# Patient Record
Sex: FEMALE | Race: WHITE
[De-identification: names, ages, dates, MRNs, and addresses within clinical notes are randomized per-mention and may not be internally consistent; named-entity substitution may affect disease eponyms.]

---

## 2018-06-19 ENCOUNTER — HOSPITAL ENCOUNTER (OUTPATIENT)
Dept: HOSPITAL 92 - DTY/OP | Age: 53
Discharge: HOME | End: 2018-06-19
Attending: SURGERY
Payer: COMMERCIAL

## 2018-06-19 DIAGNOSIS — I10: ICD-10-CM

## 2018-06-19 DIAGNOSIS — E11.65: ICD-10-CM

## 2018-06-19 DIAGNOSIS — E66.01: Primary | ICD-10-CM

## 2018-06-19 PROCEDURE — 97802 MEDICAL NUTRITION INDIV IN: CPT

## 2018-12-17 ENCOUNTER — HOSPITAL ENCOUNTER (OUTPATIENT)
Dept: HOSPITAL 92 - LABBT | Age: 53
Discharge: HOME | End: 2018-12-17
Attending: SURGERY
Payer: COMMERCIAL

## 2018-12-17 ENCOUNTER — HOSPITAL ENCOUNTER (INPATIENT)
Dept: HOSPITAL 92 - SURG A | Age: 53
LOS: 4 days | Discharge: HOME | DRG: 621 | End: 2018-12-21
Attending: SURGERY | Admitting: SURGERY
Payer: COMMERCIAL

## 2018-12-17 VITALS — BODY MASS INDEX: 48.6 KG/M2

## 2018-12-17 DIAGNOSIS — E11.9: ICD-10-CM

## 2018-12-17 DIAGNOSIS — E66.01: ICD-10-CM

## 2018-12-17 DIAGNOSIS — Z79.84: ICD-10-CM

## 2018-12-17 DIAGNOSIS — Z01.818: Primary | ICD-10-CM

## 2018-12-17 DIAGNOSIS — E66.01: Primary | ICD-10-CM

## 2018-12-17 DIAGNOSIS — I10: ICD-10-CM

## 2018-12-17 LAB
ALBUMIN SERPL BCG-MCNC: 4.7 G/DL (ref 3.5–5)
ALP SERPL-CCNC: 75 U/L (ref 40–150)
ALT SERPL W P-5'-P-CCNC: 97 U/L (ref 8–55)
ANION GAP SERPL CALC-SCNC: 13 MMOL/L (ref 10–20)
AST SERPL-CCNC: 52 U/L (ref 5–34)
BASOPHILS # BLD AUTO: 0 THOU/UL (ref 0–0.2)
BASOPHILS NFR BLD AUTO: 0.4 % (ref 0–1)
BILIRUB DIRECT SERPL-MCNC: 0.3 MG/DL (ref 0.1–0.3)
BILIRUB SERPL-MCNC: 0.5 MG/DL (ref 0.2–1.2)
BUN SERPL-MCNC: 10 MG/DL (ref 9.8–20.1)
CALCIUM SERPL-MCNC: 10 MG/DL (ref 7.8–10.44)
CHLORIDE SERPL-SCNC: 102 MMOL/L (ref 98–107)
CO2 SERPL-SCNC: 26 MMOL/L (ref 22–29)
CREAT CL PREDICTED SERPL C-G-VRATE: 0 ML/MIN (ref 70–130)
EOSINOPHIL # BLD AUTO: 0.1 THOU/UL (ref 0–0.7)
EOSINOPHIL NFR BLD AUTO: 1.9 % (ref 0–10)
GLOBULIN SER CALC-MCNC: 3.9 G/DL (ref 2.4–3.5)
GLUCOSE SERPL-MCNC: 120 MG/DL (ref 70–105)
HGB BLD-MCNC: 14.5 G/DL (ref 12–16)
LYMPHOCYTES # BLD: 2.6 THOU/UL (ref 1.2–3.4)
LYMPHOCYTES NFR BLD AUTO: 34.1 % (ref 21–51)
MCH RBC QN AUTO: 30.8 PG (ref 27–31)
MCV RBC AUTO: 93.4 FL (ref 78–98)
MONOCYTES # BLD AUTO: 0.6 THOU/UL (ref 0.11–0.59)
MONOCYTES NFR BLD AUTO: 7.8 % (ref 0–10)
NEUTROPHILS # BLD AUTO: 4.2 THOU/UL (ref 1.4–6.5)
NEUTROPHILS NFR BLD AUTO: 55.8 % (ref 42–75)
PLATELET # BLD AUTO: 242 THOU/UL (ref 130–400)
POTASSIUM SERPL-SCNC: 3.7 MMOL/L (ref 3.5–5.1)
PREGS CONTROL BACKGROUND?: (no result)
PREGS CONTROL BAR APPEAR?: YES
RBC # BLD AUTO: 4.72 MILL/UL (ref 4.2–5.4)
SODIUM SERPL-SCNC: 137 MMOL/L (ref 136–145)
WBC # BLD AUTO: 7.5 THOU/UL (ref 4.8–10.8)

## 2018-12-17 PROCEDURE — 85025 COMPLETE CBC W/AUTO DIFF WBC: CPT

## 2018-12-17 PROCEDURE — 83036 HEMOGLOBIN GLYCOSYLATED A1C: CPT

## 2018-12-17 PROCEDURE — 80076 HEPATIC FUNCTION PANEL: CPT

## 2018-12-17 PROCEDURE — 93010 ELECTROCARDIOGRAM REPORT: CPT

## 2018-12-17 PROCEDURE — 80053 COMPREHEN METABOLIC PANEL: CPT

## 2018-12-17 PROCEDURE — 71045 X-RAY EXAM CHEST 1 VIEW: CPT

## 2018-12-17 PROCEDURE — 88312 SPECIAL STAINS GROUP 1: CPT

## 2018-12-17 PROCEDURE — 36416 COLLJ CAPILLARY BLOOD SPEC: CPT

## 2018-12-17 PROCEDURE — 84703 CHORIONIC GONADOTROPIN ASSAY: CPT

## 2018-12-17 PROCEDURE — 88307 TISSUE EXAM BY PATHOLOGIST: CPT

## 2018-12-17 PROCEDURE — 93005 ELECTROCARDIOGRAM TRACING: CPT

## 2018-12-17 PROCEDURE — C9113 INJ PANTOPRAZOLE SODIUM, VIA: HCPCS

## 2018-12-17 PROCEDURE — 80048 BASIC METABOLIC PNL TOTAL CA: CPT

## 2018-12-17 PROCEDURE — 36415 COLL VENOUS BLD VENIPUNCTURE: CPT

## 2018-12-18 NOTE — EKG
Test Reason : 

Blood Pressure : ***/*** mmHG

Vent. Rate : 091 BPM     Atrial Rate : 091 BPM

   P-R Int : 150 ms          QRS Dur : 090 ms

    QT Int : 356 ms       P-R-T Axes : [phone] degrees

   QTc Int : 437 ms

 

Normal sinus rhythm

Normal ECG

When compared with ECG of 11-JUN-2001 09:08,

No significant change was found

Confirmed by TORIBIO AGUILAR ([id number]) on 12/18/2018 5:03:50 PM

 

Referred By:  MADDY           Confirmed By:TORIBIO AGUILAR

## 2018-12-20 PROCEDURE — 0DB64Z3 EXCISION OF STOMACH, PERCUTANEOUS ENDOSCOPIC APPROACH, VERTICAL: ICD-10-PCS | Performed by: SURGERY

## 2018-12-20 PROCEDURE — 0DJ08ZZ INSPECTION OF UPPER INTESTINAL TRACT, VIA NATURAL OR ARTIFICIAL OPENING ENDOSCOPIC: ICD-10-PCS | Performed by: SURGERY

## 2018-12-20 NOTE — RAD
PORTABLE CHEST ONE VIEW:

 

Date: 12-20-18 

Time: 9:37 a.m.

 

History: Pre-operative evaluation. 

 

FINDINGS: 

The heart size is borderline. The lungs are expanded without focal areas of consolidation, pneumothor
ax, shanice pulmonary edema or pleural effusions. 

 

IMPRESSION: 

No acute process. 

 

POS: REBAH

## 2018-12-21 VITALS — DIASTOLIC BLOOD PRESSURE: 85 MMHG | SYSTOLIC BLOOD PRESSURE: 164 MMHG | TEMPERATURE: 98.7 F

## 2018-12-21 LAB
ANION GAP SERPL CALC-SCNC: 13 MMOL/L (ref 10–20)
BASOPHILS # BLD AUTO: 0 THOU/UL (ref 0–0.2)
BASOPHILS NFR BLD AUTO: 0.2 % (ref 0–1)
BUN SERPL-MCNC: 7 MG/DL (ref 9.8–20.1)
CALCIUM SERPL-MCNC: 9 MG/DL (ref 7.8–10.44)
CHLORIDE SERPL-SCNC: 103 MMOL/L (ref 98–107)
CO2 SERPL-SCNC: 26 MMOL/L (ref 22–29)
CREAT CL PREDICTED SERPL C-G-VRATE: 147 ML/MIN (ref 70–130)
EOSINOPHIL # BLD AUTO: 0 THOU/UL (ref 0–0.7)
EOSINOPHIL NFR BLD AUTO: 0.3 % (ref 0–10)
GLUCOSE SERPL-MCNC: 114 MG/DL (ref 70–105)
HGB BLD-MCNC: 11.7 G/DL (ref 12–16)
LYMPHOCYTES # BLD: 2.2 THOU/UL (ref 1.2–3.4)
LYMPHOCYTES NFR BLD AUTO: 21 % (ref 21–51)
MCH RBC QN AUTO: 31.7 PG (ref 27–31)
MCV RBC AUTO: 94.5 FL (ref 78–98)
MONOCYTES # BLD AUTO: 0.9 THOU/UL (ref 0.11–0.59)
MONOCYTES NFR BLD AUTO: 8.1 % (ref 0–10)
NEUTROPHILS # BLD AUTO: 7.5 THOU/UL (ref 1.4–6.5)
NEUTROPHILS NFR BLD AUTO: 70.5 % (ref 42–75)
PLATELET # BLD AUTO: 214 THOU/UL (ref 130–400)
POTASSIUM SERPL-SCNC: 4 MMOL/L (ref 3.5–5.1)
RBC # BLD AUTO: 3.69 MILL/UL (ref 4.2–5.4)
SODIUM SERPL-SCNC: 138 MMOL/L (ref 136–145)
WBC # BLD AUTO: 10.6 THOU/UL (ref 4.8–10.8)

## 2018-12-21 RX ADMIN — ONDANSETRON PRN MG: 2 INJECTION INTRAMUSCULAR; INTRAVENOUS at 02:01

## 2018-12-21 RX ADMIN — ONDANSETRON PRN MG: 2 INJECTION INTRAMUSCULAR; INTRAVENOUS at 10:41

## 2018-12-21 NOTE — DIS
DATE OF ADMISSION:  12/20/2018



DATE OF DISCHARGE:  12/21/2018



ADMISSION DIAGNOSES:  Morbid obesity, diabetes mellitus, and hypertension.



DISCHARGE DIAGNOSES:  Morbid obesity, diabetes mellitus, and hypertension.



PROCEDURE:  Laparoscopic sleeve gastrectomy by Dr. Masters without complication.



CONDITION ON DISCHARGE:  Improved.



STAFF:  Dr. Masters.



HOSPITAL COURSE:  On postop day #1, the patient is ambulatory.  She is tolerating a

clear liquid diet without difficulty.  Her wounds are clear.  Her pain is

controlled.  She is being discharged home.  Prescriptions already sent over to her

pharmacy.  She is going to hold her metformin for first few weeks after surgery.

She will follow up with me in 2 weeks. 







Job ID:  [id number]

## 2018-12-21 NOTE — OP
DATE OF PROCEDURE:  12/20/2018



PREOPERATIVE DIAGNOSES:  

1. Morbid obesity with a body mass index of 52.

2. Type 2 diabetes.

3. Hypertension.



POSTOPERATIVE DIAGNOSES:  

1. Morbid obesity with a body mass index of 52.

2. Type 2 diabetes.

3. Hypertension.



PROCEDURES PERFORMED:  Laparoscopic sleeve gastrectomy with [place] staple line

reinforcements and 38-[place] bougie. 



ANESTHESIA:  General.



ESTIMATED BLOOD LOSS:  Minimal.



COMPLICATION:  None.



SPECIMEN:  Stomach.



FINDINGS:  Normal postoperative EGD.



DESCRIPTION OF PROCEDURE:  The patient was taken to the operating room and laid

supine on the operating room table.  After general anesthetic was obtained, the arms

and legs were double strapped to bariatric table.  OG tube was used to decompress

the stomach.  The abdomen was prepped and draped in a sterile fashion.  Left

subcostal 5-mm Optiview trocar was placed in the usual fashion and high-flow

pneumoperitoneum was obtained.  Left and right abdominal 12 mm ports as well as

right subcostal 5 mm port were placed under direct visualization.  A 5-mm incision

was made at the xiphoid and the Gina was used to raise the liver off the GE

junction.  Short gastrics were taken down to mid body of stomach to left filiberto of the

diaphragm.  Left filiberto, posterior fundus, and angle of His were completely dissected.

 The short gastrics were taken down to a distance of 6 cm proximal to the pylorus.

Multiple loads of the [place] stapling device were used to perform the sleeve.  The

first was fired up at a distance of 6 cm proximal to the pylorus, angled up towards

the incisura.  A 38-[place] bougie had been brought in, its tip left in the antrum of

the stomach.  Care was taken to avoid being too close to the incisura.  Multiple

loads were then fired up along the bougie.  Stomach was completely transected at the

angle of His.  The stomach was removed from the left abdominal incision.  This

fascial defect was closed using [place] needle 0 Vicryl tie.  All port sites were

infiltrated using local anesthetic.  The bougie was removed and the EGD scope was

passed through esophagus and stomach to the level of duodenum without obstruction.

There was no stricture at the incisura.  There was no evidence of air leakage or

bleeding.  The EGD scope was used to decompress the stomach, it was pulled and

removed.  The Gina retractor was removed under direct visualization without

bleeding.  All ports were removed under direct visualization without bleeding and

pneumoperitoneum was let down.  The Vicryl was used to close the passage of defect

from the left abdominal incisions.  All incisions were irrigated and closed using

4-0 Monocryl and Dermabond.  The patient was sent to Recovery in stable condition.

All instrument counts, needle counts, and lap counts were correct. 







Job ID:  [id number]

## 2024-12-12 ENCOUNTER — HOSPITAL ENCOUNTER (EMERGENCY)
Dept: HOSPITAL 92 - ERS | Age: 59
Discharge: HOME | End: 2024-12-12
Payer: COMMERCIAL

## 2024-12-12 DIAGNOSIS — E03.9: ICD-10-CM

## 2024-12-12 DIAGNOSIS — Z79.899: ICD-10-CM

## 2024-12-12 DIAGNOSIS — E11.9: ICD-10-CM

## 2024-12-12 DIAGNOSIS — Z86.718: ICD-10-CM

## 2024-12-12 DIAGNOSIS — I10: ICD-10-CM

## 2024-12-12 DIAGNOSIS — M25.572: ICD-10-CM

## 2024-12-12 DIAGNOSIS — M25.562: Primary | ICD-10-CM

## 2024-12-12 DIAGNOSIS — Z55.0: ICD-10-CM
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
## 2024-12-12 PROCEDURE — 96372 THER/PROPH/DIAG INJ SC/IM: CPT

## 2024-12-12 PROCEDURE — 99283 EMERGENCY DEPT VISIT LOW MDM: CPT

## 2024-12-12 SDOH — EDUCATIONAL SECURITY - EDUCATION ATTAINMENT: ILITERACY AND LOW LEVEL LITERACY: Z55.0
